# Patient Record
Sex: MALE | Race: WHITE | Employment: OTHER | URBAN - METROPOLITAN AREA
[De-identification: names, ages, dates, MRNs, and addresses within clinical notes are randomized per-mention and may not be internally consistent; named-entity substitution may affect disease eponyms.]

---

## 2022-12-10 ENCOUNTER — APPOINTMENT (OUTPATIENT)
Dept: CT IMAGING | Age: 77
End: 2022-12-10
Payer: MEDICARE

## 2022-12-10 ENCOUNTER — HOSPITAL ENCOUNTER (EMERGENCY)
Age: 77
Discharge: ANOTHER HOSPITAL | End: 2022-12-10
Attending: EMERGENCY MEDICINE
Payer: MEDICARE

## 2022-12-10 ENCOUNTER — APPOINTMENT (OUTPATIENT)
Dept: GENERAL RADIOLOGY | Age: 77
End: 2022-12-10
Payer: MEDICARE

## 2022-12-10 VITALS
RESPIRATION RATE: 15 BRPM | SYSTOLIC BLOOD PRESSURE: 165 MMHG | DIASTOLIC BLOOD PRESSURE: 92 MMHG | TEMPERATURE: 98.2 F | OXYGEN SATURATION: 99 % | HEART RATE: 64 BPM

## 2022-12-10 DIAGNOSIS — I63.9 ACUTE CVA (CEREBROVASCULAR ACCIDENT) (HCC): Primary | ICD-10-CM

## 2022-12-10 LAB
ALBUMIN SERPL-MCNC: 3.8 GM/DL (ref 3.4–5)
ALP BLD-CCNC: 57 IU/L (ref 40–129)
ALT SERPL-CCNC: 10 U/L (ref 10–40)
ANION GAP SERPL CALCULATED.3IONS-SCNC: 8 MMOL/L (ref 4–16)
AST SERPL-CCNC: 13 IU/L (ref 15–37)
BASOPHILS ABSOLUTE: 0.1 K/CU MM
BASOPHILS RELATIVE PERCENT: 1.1 % (ref 0–1)
BILIRUB SERPL-MCNC: 0.3 MG/DL (ref 0–1)
BUN BLDV-MCNC: 25 MG/DL (ref 6–23)
CALCIUM SERPL-MCNC: 8.6 MG/DL (ref 8.3–10.6)
CHLORIDE BLD-SCNC: 102 MMOL/L (ref 99–110)
CO2: 26 MMOL/L (ref 21–32)
CREAT SERPL-MCNC: 1.2 MG/DL (ref 0.9–1.3)
DIFFERENTIAL TYPE: ABNORMAL
EKG ATRIAL RATE: 57 BPM
EKG DIAGNOSIS: NORMAL
EKG P AXIS: 20 DEGREES
EKG P-R INTERVAL: 182 MS
EKG Q-T INTERVAL: 448 MS
EKG QRS DURATION: 110 MS
EKG QTC CALCULATION (BAZETT): 436 MS
EKG R AXIS: -22 DEGREES
EKG T AXIS: 8 DEGREES
EKG VENTRICULAR RATE: 57 BPM
EOSINOPHILS ABSOLUTE: 0.2 K/CU MM
EOSINOPHILS RELATIVE PERCENT: 3.5 % (ref 0–3)
GFR SERPL CREATININE-BSD FRML MDRD: >60 ML/MIN/1.73M2
GLUCOSE BLD-MCNC: 169 MG/DL
GLUCOSE BLD-MCNC: 169 MG/DL (ref 70–99)
GLUCOSE BLD-MCNC: 183 MG/DL (ref 70–99)
HCT VFR BLD CALC: 42.8 % (ref 42–52)
HEMOGLOBIN: 14.1 GM/DL (ref 13.5–18)
IMMATURE NEUTROPHIL %: 0.5 % (ref 0–0.43)
INR BLD: 0.96 INDEX
LYMPHOCYTES ABSOLUTE: 1.4 K/CU MM
LYMPHOCYTES RELATIVE PERCENT: 21.7 % (ref 24–44)
MCH RBC QN AUTO: 28.2 PG (ref 27–31)
MCHC RBC AUTO-ENTMCNC: 32.9 % (ref 32–36)
MCV RBC AUTO: 85.6 FL (ref 78–100)
MONOCYTES ABSOLUTE: 0.8 K/CU MM
MONOCYTES RELATIVE PERCENT: 11.9 % (ref 0–4)
NUCLEATED RBC %: 0 %
PDW BLD-RTO: 13.5 % (ref 11.7–14.9)
PLATELET # BLD: 167 K/CU MM (ref 140–440)
PMV BLD AUTO: 12.5 FL (ref 7.5–11.1)
POTASSIUM SERPL-SCNC: 4.4 MMOL/L (ref 3.5–5.1)
PROTHROMBIN TIME: 12.4 SECONDS (ref 11.7–14.5)
RBC # BLD: 5 M/CU MM (ref 4.6–6.2)
SEGMENTED NEUTROPHILS ABSOLUTE COUNT: 3.9 K/CU MM
SEGMENTED NEUTROPHILS RELATIVE PERCENT: 61.3 % (ref 36–66)
SODIUM BLD-SCNC: 136 MMOL/L (ref 135–145)
TOTAL IMMATURE NEUTOROPHIL: 0.03 K/CU MM
TOTAL NUCLEATED RBC: 0 K/CU MM
TOTAL PROTEIN: 6.9 GM/DL (ref 6.4–8.2)
TROPONIN T: <0.01 NG/ML
WBC # BLD: 6.3 K/CU MM (ref 4–10.5)

## 2022-12-10 PROCEDURE — 85025 COMPLETE CBC W/AUTO DIFF WBC: CPT

## 2022-12-10 PROCEDURE — 82962 GLUCOSE BLOOD TEST: CPT

## 2022-12-10 PROCEDURE — 80053 COMPREHEN METABOLIC PANEL: CPT

## 2022-12-10 PROCEDURE — 70450 CT HEAD/BRAIN W/O DYE: CPT

## 2022-12-10 PROCEDURE — 99285 EMERGENCY DEPT VISIT HI MDM: CPT

## 2022-12-10 PROCEDURE — 85610 PROTHROMBIN TIME: CPT

## 2022-12-10 PROCEDURE — 70498 CT ANGIOGRAPHY NECK: CPT

## 2022-12-10 PROCEDURE — 84484 ASSAY OF TROPONIN QUANT: CPT

## 2022-12-10 PROCEDURE — 71045 X-RAY EXAM CHEST 1 VIEW: CPT

## 2022-12-10 PROCEDURE — 93005 ELECTROCARDIOGRAM TRACING: CPT | Performed by: EMERGENCY MEDICINE

## 2022-12-10 PROCEDURE — 6360000004 HC RX CONTRAST MEDICATION: Performed by: EMERGENCY MEDICINE

## 2022-12-10 RX ADMIN — IOPAMIDOL 80 ML: 755 INJECTION, SOLUTION INTRAVENOUS at 09:57

## 2022-12-10 NOTE — ED NOTES
Transfer discussed with pt and daughter. All questions answered.  Pt a/o at this time and being transferred by MICU to [facility]  12/10/22 2795

## 2022-12-10 NOTE — ED NOTES
Pt arrived via ems with c/o slurred speech and left sided weakness. Per pts family pts LKW was at midnight.  [address] [place] [address]  12/10/22 8721

## 2022-12-10 NOTE — ED PROVIDER NOTES
Vitals   Enc Vitals Group      BP 12/10/22 0955 (!) 180/106      Heart Rate 12/10/22 0955 61      Resp 12/10/22 0955 16      Temp 12/10/22 1001 98.2 °F (36.8 °C)      Temp Source 12/10/22 1001 Oral      SpO2 12/10/22 0955 99 %      Weight --       Height --       Head Circumference --       Peak Flow --       Pain Score --       Pain Loc --       Pain Edu? --       Excl. in [address]? --      GENERAL APPEARANCE: Lying quietly with eyes closed. HEAD: Normocephalic. Atraumatic. EYES: EOM's grossly intact. Sclera anicteric. ENT: Tolerates saliva. No trismus. NECK: Supple. Trachea midline. CARDIO: RRR. Radial pulse 2+. LUNGS: Respirations unlabored. CTAB. ABDOMEN: Soft. Non-distended. Non-tender. EXTREMITIES: No acute deformities. SKIN: Warm and dry. NEUROLOGICAL: James Crease NIH Stroke Scale at 10:50 AM is:  Level of Consciousness:  1 - not alert but arousable by minor stimulation to obey, answer or respond    LOC Questions:  0 - answers both questions correctly    LOC Commands:  0 - performs both tasks correctly    Best Gaze:  2 - forced deviation, or total gaze paresis not overcome by oculocephalic maneuver    Visual Fields:  0 - no visual loss    Facial Palsy:  2 - partial paralysis (total or near total paralysis of the lower face)    Motor-Arm-Left:  4 - no movement    Motor-Leg-Left:  3 - no effort against gravity; leg falls to bed immediately    Motor-Arm-Right:  1 - drift, limb holds 90 (or 45) degrees but drifts down before full 10 seconds: does not hit bed    Motor-Leg-Right:  1 - drift; leg falls by the end of the 5 second period but does not hit bed    Limb Ataxia:  0 - absent    Sensory:  2 - severe to total sensory loss; patient is not aware of being touched in face, arm, leg    Best Language:  2 - severe aphasia; all communication is through fragmentary expression; great need for inference, questioning, and guessing by the listener.   Range of information that can be exchanged is limited; listener carries burden of communication. Examiner cannot identify materials provided from patient response. Dysarthria:  1 - mild to moderate, patient slurs at least some words and at worst, can be understood with some difficulty    Extinction and Inattention:  2 - profound merry-inattention or merry- inattention to more than one modality. Does not recognize own hand or orients only to one side of space     PSYCHIATRIC: Normal mood.      Labs:  Results for orders placed or performed during the hospital encounter of 12/10/22   CBC with Auto Differential   Result Value Ref Range    WBC 6.3 4.0 - 10.5 K/CU MM    RBC 5.00 4.6 - 6.2 M/CU MM    Hemoglobin 14.1 13.5 - 18.0 GM/DL    Hematocrit 42.8 42 - 52 %    MCV 85.6 78 - 100 FL    MCH 28.2 27 - 31 PG    MCHC 32.9 32.0 - 36.0 %    RDW 13.5 11.7 - 14.9 %    Platelets 501 960 - 346 K/CU MM    MPV 12.5 (H) 7.5 - 11.1 FL    Differential Type AUTOMATED DIFFERENTIAL     Segs Relative 61.3 36 - 66 %    Lymphocytes % 21.7 (L) 24 - 44 %    Monocytes % 11.9 (H) 0 - 4 %    Eosinophils % 3.5 (H) 0 - 3 %    Basophils % 1.1 (H) 0 - 1 %    Segs Absolute 3.9 K/CU MM    Lymphocytes Absolute 1.4 K/CU MM    Monocytes Absolute 0.8 K/CU MM    Eosinophils Absolute 0.2 K/CU MM    Basophils Absolute 0.1 K/CU MM    Nucleated RBC % 0.0 %    Total Nucleated RBC 0.0 K/CU MM    Total Immature Neutrophil 0.03 K/CU MM    Immature Neutrophil % 0.5 (H) 0 - 0.43 %   Protime-INR   Result Value Ref Range    Protime 12.4 11.7 - 14.5 SECONDS    INR 0.96 INDEX   POCT Glucose   Result Value Ref Range    Glucose 169 mg/dL   POCT Glucose   Result Value Ref Range    POC Glucose 169 (H) 70 - 99 MG/DL   EKG 12 Lead   Result Value Ref Range    Ventricular Rate 57 BPM    Atrial Rate 57 BPM    P-R Interval 182 ms    QRS Duration 110 ms    Q-T Interval 448 ms    QTc Calculation (Bazett) 436 ms    P Axis 20 degrees    R Axis -22 degrees    T Axis 8 degrees    Diagnosis       Sinus bradycardia  Moderate voltage criteria for LVH, may be normal variant  Borderline ECG  No previous ECGs available         EKG (if obtained): (All EKG's are interpreted by myself in the absence of a cardiologist)  Sinus bradycardia 57. Left axis with poor R wave progression. No ST elevation or depression. No prior EKG available for comparison. Radiographs (if obtained):  [] The following radiograph was interpreted by myself in the absence of a radiologist:  [x] Radiologist's Report reviewed at time of ED visit:  CT HEAD WO CONTRAST    Result Date: 12/10/2022  EXAMINATION: CT OF THE HEAD WITHOUT CONTRAST  12/10/2022 9:43 am TECHNIQUE: CT of the head was performed without the administration of intravenous contrast. Automated exposure control, iterative reconstruction, and/or weight based adjustment of the mA/kV was utilized to reduce the radiation dose to as low as reasonably achievable. COMPARISON: None. HISTORY: ORDERING SYSTEM PROVIDED HISTORY: Stroke Symptoms TECHNOLOGIST PROVIDED HISTORY: Has a \"code stroke\" or \"stroke alert\" been called? ->Yes Reason for exam:->Stroke Symptoms Decision Support Exception - unselect if not a suspected or confirmed emergency medical condition->Emergency Medical Condition (MA) Reason for Exam: left sided weakness and slurred speech FINDINGS: BRAIN/VENTRICLES: There is no acute intracranial hemorrhage, mass effect or midline shift. No abnormal extra-axial fluid collection. The gray-white differentiation is maintained without evidence of an acute infarct. There is no evidence of hydrocephalus. There is mild generalized prominence of the ventricular and cisternal spaces. Mild decreased attenuation in the periventricular and subcortical white matter most consistent with small vessel ischemic change. Scattered intracranial atherosclerotic vascular calcification. ORBITS: The visualized portion of the orbits demonstrate no acute abnormality.  SINUSES: The visualized paranasal sinuses and mastoid air cells demonstrate no acute abnormality. SOFT TISSUES/SKULL:  No acute abnormality of the visualized skull or soft tissues. No acute intracranial abnormality. Findings were discussed with ROCIO NICOLE at 9:54 a.m. on 12/10/2022 by REBECA Treadwell XR CHEST PORTABLE    Result Date: 12/10/2022  EXAMINATION: ONE XRAY VIEW OF THE CHEST 12/10/2022 10:09 am COMPARISON: None. HISTORY: ORDERING SYSTEM PROVIDED HISTORY: stroke symptoms TECHNOLOGIST PROVIDED HISTORY: Reason for exam:->stroke symptoms Reason for Exam: stroke symptoms FINDINGS: The cardiac silhouette is mildly enlarged. Aortic vascular calcification with mild tortuosity. Lung volumes are mildly decreased. No infiltrate, pleural fluid or evidence of overt failure. No acute cardiopulmonary disease. Cardiomegaly without overt failure. CTA HEAD NECK W CONTRAST    Result Date: 12/10/2022  EXAMINATION: CTA OF THE HEAD AND NECK WITH CONTRAST 12/10/2022 9:51 am: TECHNIQUE: CTA of the head and neck was performed with the administration of intravenous contrast. Multiplanar reformatted images are provided for review. MIP images are provided for review. Stenosis of the internal carotid arteries measured using NASCET criteria. Automated exposure control, iterative reconstruction, and/or weight based adjustment of the mA/kV was utilized to reduce the radiation dose to as low as reasonably achievable. This scan was analyzed using [employer]. ai contact LVO. Identification of suspected findings is not for diagnostic use beyond notification. Viz LVO is limited to analysis of imaging data and should not be used in-lieu of full patient evaluation or relied upon to make or confirm diagnosis. COMPARISON: None. HISTORY: ORDERING SYSTEM PROVIDED HISTORY: Stroke Symptoms TECHNOLOGIST PROVIDED HISTORY: Has a \"code stroke\" or \"stroke alert\" been called? ->Yes Reason for exam:->Stroke Symptoms Decision Support Exception - unselect if not a suspected or confirmed emergency medical condition->Emergency Medical Condition (MA) Reason for Exam: left sided weakness and slurred speech Additional signs and symptoms: 80ml Isovue 370 FINDINGS: CTA NECK: AORTIC ARCH/ARCH VESSELS: No dissection or arterial injury. No significant stenosis of the brachiocephalic or subclavian arteries. CAROTID ARTERIES: Critical near-complete focal occlusion of the proximal right cervical internal carotid artery. The bilateral common left internal carotid arteries appear patent without significant stenosis. VERTEBRAL ARTERIES: Severe stenosis of the right vertebral artery origin. Severe stenosis of the left vertebral artery origin. SOFT TISSUES: The lung apices are clear. No cervical or superior mediastinal lymphadenopathy. The larynx and pharynx are unremarkable. No acute abnormality of the salivary and thyroid glands. Incidental thyroid nodules, possibly measuring up to 1.5 cm on the left. BONES: No acute osseous abnormality. Multilevel cervical spondylosis. Left upper 2nd molar periapical lucency. CTA HEAD: ANTERIOR CIRCULATION: Significant asymmetrically decreased contrast enhancement of the right internal carotid artery, anterior cerebral arteries, and middle cerebral artery. The left internal carotid artery, anterior cerebral artery, middle cerebral artery appear patent without significant stenosis. No aneurysm. POSTERIOR CIRCULATION: No significant stenosis of the vertebral, basilar, or posterior cerebral arteries. No aneurysm. OTHER: No dural venous sinus thrombosis on this non-dedicated study. BRAIN: Loss of gray-white matter differentiation in the right MCA territory and possibly right SUNNY territory. No mass effect or midline shift. No extra-axial fluid collection. 1. Critical near-complete focal occlusion of the proximal right cervical ICA. There is significantly decreased contrast enhancement in the subsequent right anterior circulation of the head and neck.  2. Loss of gray-white matter differentiation in the right MCA territory and possibly right SUNNY territory compatible large territorial ischemia. 3. Severe stenosis of the right vertebral artery origin. 4. Severe stenosis of the left vertebral artery origin. 5. Incidental thyroid nodules, possibly measuring up to 1.5 cm on the left. Can correlate with non-emergent thyroid ultrasound evaluation. 6. Incidental left upper 2nd molar periapical lucency. Can correlate with dental examination. Preliminary findings discussed with Dr. Charlotte Pepe on 12/10/2022 at 10:07 a.m. ED Course and MDM:  Stroke alert initiated prehospital per EMS report. He is taken directly for CT head which is nonacute per radiologist report to myself. CTA head and neck obtained while patient in radiology. On return to the emergency department, I was able to assess the patient. NIHSS is 21 on my assessment. He is mildly hypertensive. No acute intervention undertaken in setting of acute stroke. I spoke to Dr. Jaxon Cedeno, on-call for [facility] neurology. She recommends transfer to [facility] given severity of deficits. Patient is out of the window for [facility] but may be a candidate for other intervention. Patient will be transferred by critical care. They are unable to fly currently due to weather. Final results of the CTA have been communicated to [facility] with results noted above. I have also spoken with patient's daughter and the patient himself to make them aware of the findings, concerns, and plan of care. Patient transferred in serious condition. I spent at least 34 minutes of critical care time with this patient. This does not include time spent on separately reported billable procedures. Critical care time provided for acute CVA that required close evaluation and/or intervention with concern for patient decompensation. Final Impression:  1.  Acute CVA (cerebrovascular accident) [facility])      DISPOSITION Decision To Transfer 12/10/2022 10:12:58 AM      Patient referred to:  No follow-up provider specified.   Discharge medications:  New Prescriptions    No medications on file     (Please note that portions of this note may have been completed with a voice recognition program. Efforts were made to edit the dictations but occasionally words are mis-transcribed.)    DO Jolie Dixno DO  12/10/22 1910

## 2022-12-10 NOTE — ED NOTES
Transfer to [facility]  Reason: Acute CVA  ED provider: Dr Quan Varma  Stroke Attending: Dr Darcy [facility]  [address]  12/10/22 6467

## 2022-12-10 NOTE — ED NOTES
Spoke with Nam at [address] [place] [address] at 21 [phone] and confirmed the fax was received by the [address] [place] [address] transfer center     [facility]  12/10/22 0771

## 2022-12-13 LAB
EKG ATRIAL RATE: 57 BPM
EKG DIAGNOSIS: NORMAL
EKG P AXIS: 20 DEGREES
EKG P-R INTERVAL: 182 MS
EKG Q-T INTERVAL: 448 MS
EKG QRS DURATION: 110 MS
EKG QTC CALCULATION (BAZETT): 436 MS
EKG R AXIS: -22 DEGREES
EKG T AXIS: 8 DEGREES
EKG VENTRICULAR RATE: 57 BPM

## 2022-12-13 PROCEDURE — 93010 ELECTROCARDIOGRAM REPORT: CPT | Performed by: INTERNAL MEDICINE

## 2023-05-23 ENCOUNTER — HOSPITAL ENCOUNTER (OUTPATIENT)
Age: 78
Setting detail: SPECIMEN
Discharge: HOME | End: 2023-05-23
Payer: MEDICARE

## 2023-05-23 LAB
INR BLD: 1.09 INDEX
PROTHROMBIN TIME: 13.9 SECONDS (ref 11.7–14.5)

## 2023-05-23 PROCEDURE — 85610 PROTHROMBIN TIME: CPT

## 2023-06-05 ENCOUNTER — HOSPITAL ENCOUNTER (OUTPATIENT)
Age: 78
Setting detail: SPECIMEN
Discharge: HOME | End: 2023-06-05
Payer: MEDICARE

## 2023-06-05 LAB
ALBUMIN SERPL-MCNC: 3.8 GM/DL (ref 3.4–5)
ALP BLD-CCNC: 81 IU/L (ref 40–129)
ALT SERPL-CCNC: 17 U/L (ref 10–40)
ANION GAP SERPL CALCULATED.3IONS-SCNC: 13 MMOL/L (ref 4–16)
AST SERPL-CCNC: 19 IU/L (ref 15–37)
BILIRUB SERPL-MCNC: 0.4 MG/DL (ref 0–1)
BUN SERPL-MCNC: 23 MG/DL (ref 6–23)
CALCIUM SERPL-MCNC: 9.3 MG/DL (ref 8.3–10.6)
CHLORIDE BLD-SCNC: 97 MMOL/L (ref 99–110)
CO2: 31 MMOL/L (ref 21–32)
CREAT SERPL-MCNC: 0.9 MG/DL (ref 0.9–1.3)
GFR SERPL CREATININE-BSD FRML MDRD: >60 ML/MIN/1.73M2
GLUCOSE SERPL-MCNC: 132 MG/DL (ref 70–99)
HCT VFR BLD CALC: 39.3 % (ref 42–52)
HEMOGLOBIN: 11.6 GM/DL (ref 13.5–18)
MCH RBC QN AUTO: 26.4 PG (ref 27–31)
MCHC RBC AUTO-ENTMCNC: 29.5 % (ref 32–36)
MCV RBC AUTO: 89.3 FL (ref 78–100)
PDW BLD-RTO: 16.4 % (ref 11.7–14.9)
PLATELET # BLD: 197 K/CU MM (ref 140–440)
PMV BLD AUTO: 12.7 FL (ref 7.5–11.1)
POTASSIUM SERPL-SCNC: 3.8 MMOL/L (ref 3.5–5.1)
RBC # BLD: 4.4 M/CU MM (ref 4.6–6.2)
SODIUM BLD-SCNC: 141 MMOL/L (ref 135–145)
TOTAL PROTEIN: 7 GM/DL (ref 6.4–8.2)
TSH SERPL DL<=0.005 MIU/L-ACNC: 1.94 UIU/ML (ref 0.27–4.2)
VITAMIN B-12: 1006 PG/ML (ref 211–911)
WBC # BLD: 4.8 K/CU MM (ref 4–10.5)

## 2023-06-05 PROCEDURE — 80053 COMPREHEN METABOLIC PANEL: CPT

## 2023-06-05 PROCEDURE — 82607 VITAMIN B-12: CPT

## 2023-06-05 PROCEDURE — 85027 COMPLETE CBC AUTOMATED: CPT

## 2023-06-05 PROCEDURE — 36415 COLL VENOUS BLD VENIPUNCTURE: CPT

## 2023-06-05 PROCEDURE — 84443 ASSAY THYROID STIM HORMONE: CPT

## 2023-06-23 ENCOUNTER — HOSPITAL ENCOUNTER (OUTPATIENT)
Age: 78
Setting detail: SPECIMEN
Discharge: HOME | End: 2023-06-23
Payer: MEDICARE

## 2023-06-23 LAB
INR BLD: 1.4 INDEX
PROTHROMBIN TIME: 17.1 SECONDS (ref 11.7–14.5)

## 2023-06-23 PROCEDURE — 85610 PROTHROMBIN TIME: CPT

## 2023-06-23 PROCEDURE — 36415 COLL VENOUS BLD VENIPUNCTURE: CPT

## 2023-07-02 ENCOUNTER — HOSPITAL ENCOUNTER (OUTPATIENT)
Age: 78
Setting detail: SPECIMEN
Discharge: HOME | End: 2023-07-02
Payer: MEDICARE

## 2023-07-02 LAB
INR BLD: 1.2 INDEX
PROTHROMBIN TIME: 15.3 SECONDS (ref 11.7–14.5)

## 2023-07-02 PROCEDURE — 85610 PROTHROMBIN TIME: CPT

## 2023-07-02 PROCEDURE — 36415 COLL VENOUS BLD VENIPUNCTURE: CPT

## 2023-07-13 ENCOUNTER — HOSPITAL ENCOUNTER (OUTPATIENT)
Age: 78
Setting detail: SPECIMEN
Discharge: HOME | End: 2023-07-13
Payer: MEDICARE

## 2023-07-13 PROCEDURE — 87075 CULTR BACTERIA EXCEPT BLOOD: CPT

## 2023-07-13 PROCEDURE — 87070 CULTURE OTHR SPECIMN AEROBIC: CPT

## 2023-07-15 LAB
CULTURE: NORMAL
Lab: NORMAL
SPECIMEN: NORMAL

## 2023-07-21 LAB
CULTURE: ABNORMAL
CULTURE: ABNORMAL
Lab: ABNORMAL
SPECIMEN: ABNORMAL

## 2023-08-11 ENCOUNTER — HOSPITAL ENCOUNTER (EMERGENCY)
Age: 78
Discharge: ANOTHER HOSPITAL | End: 2023-08-11
Attending: STUDENT IN AN ORGANIZED HEALTH CARE EDUCATION/TRAINING PROGRAM
Payer: MEDICARE

## 2023-08-11 ENCOUNTER — APPOINTMENT (OUTPATIENT)
Dept: GENERAL RADIOLOGY | Age: 78
End: 2023-08-11
Payer: MEDICARE

## 2023-08-11 ENCOUNTER — APPOINTMENT (OUTPATIENT)
Dept: CT IMAGING | Age: 78
End: 2023-08-11
Payer: MEDICARE

## 2023-08-11 VITALS
SYSTOLIC BLOOD PRESSURE: 93 MMHG | HEIGHT: 69 IN | WEIGHT: 220 LBS | OXYGEN SATURATION: 94 % | RESPIRATION RATE: 18 BRPM | TEMPERATURE: 98.2 F | DIASTOLIC BLOOD PRESSURE: 61 MMHG | HEART RATE: 82 BPM | BODY MASS INDEX: 32.58 KG/M2

## 2023-08-11 DIAGNOSIS — I95.89 HYPOTENSION DUE TO HYPOVOLEMIA: ICD-10-CM

## 2023-08-11 DIAGNOSIS — S06.5XAA SDH (SUBDURAL HEMATOMA) (HCC): Primary | ICD-10-CM

## 2023-08-11 DIAGNOSIS — N17.9 AKI (ACUTE KIDNEY INJURY) (HCC): ICD-10-CM

## 2023-08-11 DIAGNOSIS — E86.0 DEHYDRATION: ICD-10-CM

## 2023-08-11 DIAGNOSIS — E86.1 HYPOTENSION DUE TO HYPOVOLEMIA: ICD-10-CM

## 2023-08-11 LAB
ALBUMIN SERPL-MCNC: 3.1 GM/DL (ref 3.4–5)
ALP BLD-CCNC: 54 IU/L (ref 40–128)
ALT SERPL-CCNC: 6 U/L (ref 10–40)
ANION GAP SERPL CALCULATED.3IONS-SCNC: 16 MMOL/L (ref 4–16)
AST SERPL-CCNC: 10 IU/L (ref 15–37)
BACTERIA: NEGATIVE /HPF
BASOPHILS ABSOLUTE: 0 K/CU MM
BASOPHILS RELATIVE PERCENT: 0.3 % (ref 0–1)
BILIRUB SERPL-MCNC: 0.8 MG/DL (ref 0–1)
BILIRUBIN URINE: NEGATIVE MG/DL
BLOOD, URINE: ABNORMAL
BUN SERPL-MCNC: 51 MG/DL (ref 6–23)
CALCIUM SERPL-MCNC: 8.5 MG/DL (ref 8.3–10.6)
CHLORIDE BLD-SCNC: 98 MMOL/L (ref 99–110)
CHP ED QC CHECK: NORMAL
CLARITY: CLEAR
CO2: 27 MMOL/L (ref 21–32)
COLOR: YELLOW
CREAT SERPL-MCNC: 4.4 MG/DL (ref 0.9–1.3)
DIFFERENTIAL TYPE: ABNORMAL
EOSINOPHILS ABSOLUTE: 0.5 K/CU MM
EOSINOPHILS RELATIVE PERCENT: 5.1 % (ref 0–3)
GFR SERPL CREATININE-BSD FRML MDRD: 13 ML/MIN/1.73M2
GLUCOSE BLD-MCNC: 122 MG/DL
GLUCOSE BLD-MCNC: 122 MG/DL (ref 70–99)
GLUCOSE SERPL-MCNC: 144 MG/DL (ref 70–99)
GLUCOSE, URINE: NEGATIVE MG/DL
HCT VFR BLD CALC: 26 % (ref 42–52)
HEMOGLOBIN: 8 GM/DL (ref 13.5–18)
HYALINE CASTS: 2 /LPF
IMMATURE NEUTROPHIL %: 0.3 % (ref 0–0.43)
INR BLD: 1.5 INDEX
KETONES, URINE: NEGATIVE MG/DL
LACTATE: 3.8 MMOL/L (ref 0.5–1.9)
LEUKOCYTE ESTERASE, URINE: NEGATIVE
LIPASE: 17 IU/L (ref 13–60)
LYMPHOCYTES ABSOLUTE: 1.3 K/CU MM
LYMPHOCYTES RELATIVE PERCENT: 14 % (ref 24–44)
MCH RBC QN AUTO: 27.1 PG (ref 27–31)
MCHC RBC AUTO-ENTMCNC: 30.8 % (ref 32–36)
MCV RBC AUTO: 88.1 FL (ref 78–100)
MONOCYTES ABSOLUTE: 0.7 K/CU MM
MONOCYTES RELATIVE PERCENT: 7.5 % (ref 0–4)
MUCUS: ABNORMAL HPF
NITRITE URINE, QUANTITATIVE: NEGATIVE
NUCLEATED RBC %: 0 %
PDW BLD-RTO: 18.6 % (ref 11.7–14.9)
PH, URINE: 5 (ref 5–8)
PLATELET # BLD: 161 K/CU MM (ref 140–440)
PMV BLD AUTO: 12.8 FL (ref 7.5–11.1)
POTASSIUM SERPL-SCNC: 4.5 MMOL/L (ref 3.5–5.1)
PROTEIN UA: ABNORMAL MG/DL
PROTHROMBIN TIME: 18.8 SECONDS (ref 11.7–14.5)
RBC # BLD: 2.95 M/CU MM (ref 4.6–6.2)
RBC URINE: <1 /HPF (ref 0–3)
SEGMENTED NEUTROPHILS ABSOLUTE COUNT: 6.6 K/CU MM
SEGMENTED NEUTROPHILS RELATIVE PERCENT: 72.8 % (ref 36–66)
SODIUM BLD-SCNC: 141 MMOL/L (ref 135–145)
SPECIFIC GRAVITY UA: 1.01 (ref 1–1.03)
T4 FREE SERPL-MCNC: 1.35 NG/DL (ref 0.9–1.8)
TOTAL IMMATURE NEUTOROPHIL: 0.03 K/CU MM
TOTAL NUCLEATED RBC: 0 K/CU MM
TOTAL PROTEIN: 6 GM/DL (ref 6.4–8.2)
TRICHOMONAS: ABNORMAL /HPF
TSH SERPL DL<=0.005 MIU/L-ACNC: 1.43 UIU/ML (ref 0.27–4.2)
UROBILINOGEN, URINE: 0.2 MG/DL (ref 0.2–1)
WBC # BLD: 9 K/CU MM (ref 4–10.5)
WBC CLUMP: ABNORMAL /HPF
WBC UA: 4 /HPF (ref 0–2)

## 2023-08-11 PROCEDURE — 6360000002 HC RX W HCPCS: Performed by: STUDENT IN AN ORGANIZED HEALTH CARE EDUCATION/TRAINING PROGRAM

## 2023-08-11 PROCEDURE — 83605 ASSAY OF LACTIC ACID: CPT

## 2023-08-11 PROCEDURE — 72125 CT NECK SPINE W/O DYE: CPT

## 2023-08-11 PROCEDURE — 82962 GLUCOSE BLOOD TEST: CPT

## 2023-08-11 PROCEDURE — 85025 COMPLETE CBC W/AUTO DIFF WBC: CPT

## 2023-08-11 PROCEDURE — 96365 THER/PROPH/DIAG IV INF INIT: CPT

## 2023-08-11 PROCEDURE — 93005 ELECTROCARDIOGRAM TRACING: CPT

## 2023-08-11 PROCEDURE — 84439 ASSAY OF FREE THYROXINE: CPT

## 2023-08-11 PROCEDURE — 85610 PROTHROMBIN TIME: CPT

## 2023-08-11 PROCEDURE — 80053 COMPREHEN METABOLIC PANEL: CPT

## 2023-08-11 PROCEDURE — 2580000003 HC RX 258: Performed by: STUDENT IN AN ORGANIZED HEALTH CARE EDUCATION/TRAINING PROGRAM

## 2023-08-11 PROCEDURE — 87040 BLOOD CULTURE FOR BACTERIA: CPT

## 2023-08-11 PROCEDURE — 84443 ASSAY THYROID STIM HORMONE: CPT

## 2023-08-11 PROCEDURE — 81001 URINALYSIS AUTO W/SCOPE: CPT

## 2023-08-11 PROCEDURE — 70450 CT HEAD/BRAIN W/O DYE: CPT

## 2023-08-11 PROCEDURE — 99285 EMERGENCY DEPT VISIT HI MDM: CPT

## 2023-08-11 PROCEDURE — 71045 X-RAY EXAM CHEST 1 VIEW: CPT

## 2023-08-11 PROCEDURE — 96361 HYDRATE IV INFUSION ADD-ON: CPT

## 2023-08-11 PROCEDURE — 83690 ASSAY OF LIPASE: CPT

## 2023-08-11 RX ORDER — 0.9 % SODIUM CHLORIDE 0.9 %
30 INTRAVENOUS SOLUTION INTRAVENOUS ONCE
Status: COMPLETED | OUTPATIENT
Start: 2023-08-11 | End: 2023-08-11

## 2023-08-11 RX ADMIN — SODIUM CHLORIDE 2121 ML: 9 INJECTION, SOLUTION INTRAVENOUS at 19:14

## 2023-08-11 RX ADMIN — LEVETIRACETAM 1000 MG: 500 INJECTION INTRAVENOUS at 21:00

## 2023-08-11 ASSESSMENT — PAIN - FUNCTIONAL ASSESSMENT: PAIN_FUNCTIONAL_ASSESSMENT: NONE - DENIES PAIN

## 2023-08-11 NOTE — ED PROVIDER NOTES
[facility]  Emergency Department Encounter    Pt Markos Pradhan  MRN: [id number]  [facility] [facility] 1945  Date of evaluation: 8/11/23  PCP:  No primary care provider on file. CHIEF COMPLAINT       Chief Complaint   Patient presents with    Altered Mental Status       HISTORY OF PRESENT ILLNESS     Harry Fregoso is a 66 y.o. male who presents with altered mental status. Patient presents via EMS from [facility]. 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 Patient taken from EMS reportedly has been more confused. Unclear baseline. Unclear last known well. Further history was obtained from daughter present at bedside. States usually he can speak in full sentences. Last known well time sometime this morning. Follows with neurosurgery, Dr. Wiley Andino at [facility]. Was recently at [facility] emergency department 2 days ago with a supratherapeutic INR around 12. Has not been taking his Coumadin for the last several days. PAST MEDICAL / SURGICAL / SOCIAL / FAMILY HISTORY      has no past medical history on file. has no past surgical history on file.     Social History     Socioeconomic History    Marital status:      Spouse name: Not on file    Number of children: Not on file    Years of education: Not on file    Highest education level: Not on file   Occupational History    Not on file   Tobacco Use    Smoking status: Not on file    Smokeless tobacco: Not on file   Substance and Sexual Activity    Alcohol use: Not on file    Drug use: Not on file    Sexual activity: Not on file   Other Topics Concern    Not on file   Social History Narrative    Not on file     Social Determinants of Health     Financial Resource Strain: Not on file   Food Insecurity: Not appear clinically dehydrated. Plan to initiate 30 cc/kg normal saline bolus. Differential includes stroke, sepsis, UTI, pneumonia, infection, aspiration, electrolyte abnormality, hypoglycemia, possible drug overdose due to narrow pupils, thyroid or metabolic abnormality among others. CBC shows Chronic anemia of 8.0 of unclear etiology, no cytosis. BMP remarkable for significant JUAN of 4.4 previous was around 1. Initial lactic acid of 3.8. He is appears significantly dehydrated. Patient was given 2 L of fluid, his hypotension improved but pressure still remained soft in the emergency department 93/61. TSH is abnormal however free T4 is normal.    Point-of-care glucose rules out hyponatremia. CT scan of the head shows 2 small acute subdural hematomas. Consulted with Dr. Nba Ronquillo with neurosurgery who recommended Keppra and transferred to higher level of care. I discussed with the family where there was ago and he preferred to go to [facility] however upon contacting [facility] health transfer center I was informed that they have beds. I was able to discuss the case with the neurosurgery team at [facility] in [place] who accept the patient. Patient will be transferred via [facility] to [facility] for neurosurgical evaluation and medical management. I stated that the benefits of transfer to the daughter who is agreeable to transfer. I spent significant mount of time discussing with her the need to transfer to higher level of care and why the patient cannot go to [facility] at this time.     Problems Addressed:  JUAN (acute kidney injury) ([address]): acute illness or injury that poses a threat to life or bodily functions  Dehydration: acute illness or injury that poses a threat to life or bodily functions  Hypotension due to hypovolemia: acute illness or injury that poses a threat to life or bodily functions  SDH (subdural hematoma) ([address]): acute illness or injury    Amount and/or

## 2023-08-12 LAB
EKG ATRIAL RATE: 77 BPM
EKG DIAGNOSIS: NORMAL
EKG P AXIS: 23 DEGREES
EKG P-R INTERVAL: 164 MS
EKG Q-T INTERVAL: 470 MS
EKG QRS DURATION: 92 MS
EKG QTC CALCULATION (BAZETT): 531 MS
EKG R AXIS: -16 DEGREES
EKG T AXIS: 8 DEGREES
EKG VENTRICULAR RATE: 77 BPM

## 2023-08-12 NOTE — ED NOTES
Christi Salgado called states Dr Radha Dominique @ [facility] is the accepting pt awaiting transport by [facility]
Dr Sherman Mills returned call
Logan Potts to transfer pt to [facility]
Paged Dr Patsy Vasquez
Detail Level: Detailed

## 2023-08-12 NOTE — PROGRESS NOTES
Patient to go to [facility] per Dr. Riddle Dates. [facility] onsite and updated. Family, Manju updated.

## 2023-08-13 LAB
CULTURE: NORMAL
CULTURE: NORMAL
Lab: NORMAL
Lab: NORMAL
SPECIMEN: NORMAL
SPECIMEN: NORMAL

## 2023-08-16 ENCOUNTER — HOSPITAL ENCOUNTER (OUTPATIENT)
Age: 78
Setting detail: SPECIMEN
Discharge: HOME | End: 2023-08-16

## 2023-08-16 LAB
CULTURE: NORMAL
CULTURE: NORMAL
Lab: NORMAL
Lab: NORMAL
SPECIMEN: NORMAL
SPECIMEN: NORMAL

## 2023-08-31 ENCOUNTER — HOSPITAL ENCOUNTER (OUTPATIENT)
Age: 78
Setting detail: SPECIMEN
Discharge: HOME | End: 2023-08-31

## 2023-08-31 LAB
INR BLD: 1.1 INDEX
PROTHROMBIN TIME: 14.6 SECONDS (ref 11.7–14.5)

## 2023-08-31 PROCEDURE — 36415 COLL VENOUS BLD VENIPUNCTURE: CPT

## 2023-08-31 PROCEDURE — 9900360100 HC STAT COLLECTION FEE SNF

## 2023-08-31 PROCEDURE — 85610 PROTHROMBIN TIME: CPT

## 2023-09-07 ENCOUNTER — HOSPITAL ENCOUNTER (OUTPATIENT)
Age: 78
Setting detail: SPECIMEN
Discharge: HOME | End: 2023-09-07
Payer: MEDICARE

## 2023-09-07 LAB
INR BLD: 1.1 INDEX
PROTHROMBIN TIME: 14.3 SECONDS (ref 11.7–14.5)

## 2023-09-07 PROCEDURE — 85610 PROTHROMBIN TIME: CPT

## 2023-09-07 PROCEDURE — 36415 COLL VENOUS BLD VENIPUNCTURE: CPT

## 2023-09-14 ENCOUNTER — HOSPITAL ENCOUNTER (OUTPATIENT)
Age: 78
Setting detail: SPECIMEN
Discharge: HOME | End: 2023-09-14
Payer: MEDICARE

## 2023-09-14 LAB
INR BLD: 1.1 INDEX
PROTHROMBIN TIME: 14.4 SECONDS (ref 11.7–14.5)

## 2023-09-14 PROCEDURE — 85610 PROTHROMBIN TIME: CPT

## 2023-09-14 PROCEDURE — 36415 COLL VENOUS BLD VENIPUNCTURE: CPT

## 2023-09-20 ENCOUNTER — HOSPITAL ENCOUNTER (OUTPATIENT)
Age: 78
Setting detail: SPECIMEN
Discharge: HOME | End: 2023-09-20
Payer: MEDICARE

## 2023-09-20 LAB
INR BLD: 1.1 INDEX
PROTHROMBIN TIME: 14.4 SECONDS (ref 11.7–14.5)

## 2023-09-20 PROCEDURE — 36415 COLL VENOUS BLD VENIPUNCTURE: CPT

## 2023-09-20 PROCEDURE — 85610 PROTHROMBIN TIME: CPT

## 2023-09-27 ENCOUNTER — HOSPITAL ENCOUNTER (OUTPATIENT)
Age: 78
Setting detail: SPECIMEN
Discharge: HOME | End: 2023-09-27
Payer: MEDICARE

## 2023-09-27 LAB
INR BLD: 1.1 INDEX
PROTHROMBIN TIME: 14.2 SECONDS (ref 11.7–14.5)

## 2023-09-27 PROCEDURE — 36415 COLL VENOUS BLD VENIPUNCTURE: CPT

## 2023-09-27 PROCEDURE — 85610 PROTHROMBIN TIME: CPT

## 2023-10-04 ENCOUNTER — HOSPITAL ENCOUNTER (OUTPATIENT)
Age: 78
Setting detail: SPECIMEN
Discharge: HOME | End: 2023-10-04
Payer: MEDICARE

## 2023-10-04 LAB
INR BLD: 1.2 INDEX
PROTHROMBIN TIME: 15.1 SECONDS (ref 11.7–14.5)

## 2023-10-04 PROCEDURE — 85610 PROTHROMBIN TIME: CPT

## 2023-10-04 PROCEDURE — 36415 COLL VENOUS BLD VENIPUNCTURE: CPT

## 2023-10-11 ENCOUNTER — HOSPITAL ENCOUNTER (OUTPATIENT)
Age: 78
Setting detail: SPECIMEN
Discharge: HOME | End: 2023-10-11
Payer: MEDICARE

## 2023-10-11 LAB
INR BLD: 1.1 INDEX
PROTHROMBIN TIME: 14.8 SECONDS (ref 11.7–14.5)

## 2023-10-11 PROCEDURE — 85610 PROTHROMBIN TIME: CPT

## 2023-10-11 PROCEDURE — 36415 COLL VENOUS BLD VENIPUNCTURE: CPT

## 2023-10-18 ENCOUNTER — HOSPITAL ENCOUNTER (OUTPATIENT)
Age: 78
Setting detail: SPECIMEN
Discharge: HOME | End: 2023-10-18
Payer: MEDICARE

## 2023-10-18 LAB
INR BLD: 1.1 INDEX
PROTHROMBIN TIME: 14.6 SECONDS (ref 11.7–14.5)

## 2023-10-18 PROCEDURE — 36415 COLL VENOUS BLD VENIPUNCTURE: CPT

## 2023-10-18 PROCEDURE — 85610 PROTHROMBIN TIME: CPT

## 2023-10-25 ENCOUNTER — HOSPITAL ENCOUNTER (OUTPATIENT)
Age: 78
Setting detail: SPECIMEN
Discharge: HOME | End: 2023-10-25
Payer: MEDICARE

## 2023-10-25 LAB
INR BLD: 1.1 INDEX
PROTHROMBIN TIME: 14.8 SECONDS (ref 11.7–14.5)

## 2023-10-25 PROCEDURE — 36415 COLL VENOUS BLD VENIPUNCTURE: CPT

## 2023-10-25 PROCEDURE — 85610 PROTHROMBIN TIME: CPT

## 2023-11-01 ENCOUNTER — HOSPITAL ENCOUNTER (OUTPATIENT)
Age: 78
Setting detail: SPECIMEN
Discharge: HOME | End: 2023-11-01
Payer: MEDICARE

## 2023-11-01 LAB
INR BLD: 1.1 INDEX
PROTHROMBIN TIME: 14.5 SECONDS (ref 11.7–14.5)

## 2023-11-01 PROCEDURE — 85610 PROTHROMBIN TIME: CPT

## 2023-11-01 PROCEDURE — 36415 COLL VENOUS BLD VENIPUNCTURE: CPT

## 2023-11-08 ENCOUNTER — HOSPITAL ENCOUNTER (OUTPATIENT)
Age: 78
Setting detail: SPECIMEN
Discharge: HOME | End: 2023-11-08
Payer: MEDICARE

## 2023-11-08 LAB
INR BLD: 1.2 INDEX
PROTHROMBIN TIME: 15 SECONDS (ref 11.7–14.5)

## 2023-11-08 PROCEDURE — 85610 PROTHROMBIN TIME: CPT

## 2023-11-08 PROCEDURE — 36415 COLL VENOUS BLD VENIPUNCTURE: CPT

## 2023-11-15 ENCOUNTER — HOSPITAL ENCOUNTER (OUTPATIENT)
Age: 78
Setting detail: SPECIMEN
Discharge: HOME | End: 2023-11-15
Payer: MEDICARE

## 2023-11-15 LAB
INR BLD: 1.1 INDEX
PROTHROMBIN TIME: 14.8 SECONDS (ref 11.7–14.5)

## 2023-11-15 PROCEDURE — 36415 COLL VENOUS BLD VENIPUNCTURE: CPT

## 2023-11-15 PROCEDURE — 85610 PROTHROMBIN TIME: CPT

## 2023-11-22 ENCOUNTER — HOSPITAL ENCOUNTER (OUTPATIENT)
Age: 78
Setting detail: SPECIMEN
Discharge: HOME | End: 2023-11-22
Payer: MEDICARE

## 2023-11-22 LAB
INR BLD: 1.2 INDEX
PROTHROMBIN TIME: 15.2 SECONDS (ref 11.7–14.5)

## 2023-11-22 PROCEDURE — 36415 COLL VENOUS BLD VENIPUNCTURE: CPT

## 2023-11-22 PROCEDURE — 85610 PROTHROMBIN TIME: CPT

## 2023-11-29 ENCOUNTER — HOSPITAL ENCOUNTER (OUTPATIENT)
Age: 78
Setting detail: SPECIMEN
Discharge: HOME | End: 2023-11-29
Payer: MEDICARE

## 2023-11-29 LAB
INR BLD: 1.1 INDEX
PROTHROMBIN TIME: 14 SECONDS (ref 11.7–14.5)

## 2023-11-29 PROCEDURE — 85610 PROTHROMBIN TIME: CPT

## 2023-11-29 PROCEDURE — 36415 COLL VENOUS BLD VENIPUNCTURE: CPT

## 2023-12-06 ENCOUNTER — HOSPITAL ENCOUNTER (OUTPATIENT)
Age: 78
Setting detail: SPECIMEN
Discharge: HOME | End: 2023-12-06
Payer: MEDICARE

## 2023-12-06 LAB
INR BLD: 1.1 INDEX
PROTHROMBIN TIME: 14.3 SECONDS (ref 11.7–14.5)

## 2023-12-06 PROCEDURE — 36415 COLL VENOUS BLD VENIPUNCTURE: CPT

## 2023-12-06 PROCEDURE — 85610 PROTHROMBIN TIME: CPT

## 2023-12-13 ENCOUNTER — HOSPITAL ENCOUNTER (OUTPATIENT)
Age: 78
Setting detail: SPECIMEN
Discharge: HOME | End: 2023-12-13
Payer: MEDICARE

## 2023-12-13 LAB
INR BLD: 1.1 INDEX
PROTHROMBIN TIME: 14.3 SECONDS (ref 11.7–14.5)

## 2023-12-13 PROCEDURE — 85610 PROTHROMBIN TIME: CPT

## 2023-12-13 PROCEDURE — 36415 COLL VENOUS BLD VENIPUNCTURE: CPT

## 2023-12-27 ENCOUNTER — HOSPITAL ENCOUNTER (OUTPATIENT)
Age: 78
Setting detail: SPECIMEN
Discharge: HOME | End: 2023-12-27
Payer: MEDICARE

## 2023-12-27 LAB
INR BLD: 1.1 INDEX
PROTHROMBIN TIME: 14.7 SECONDS (ref 11.7–14.5)

## 2023-12-27 PROCEDURE — 85610 PROTHROMBIN TIME: CPT

## 2023-12-27 PROCEDURE — 36415 COLL VENOUS BLD VENIPUNCTURE: CPT

## 2024-01-03 ENCOUNTER — HOSPITAL ENCOUNTER (OUTPATIENT)
Age: 79
Setting detail: SPECIMEN
Discharge: HOME | End: 2024-01-03
Payer: MEDICARE

## 2024-01-03 LAB
INR BLD: 1.2 INDEX
PROTHROMBIN TIME: 15 SECONDS (ref 11.7–14.5)

## 2024-01-03 PROCEDURE — 36415 COLL VENOUS BLD VENIPUNCTURE: CPT

## 2024-01-03 PROCEDURE — 85610 PROTHROMBIN TIME: CPT

## 2024-01-10 ENCOUNTER — HOSPITAL ENCOUNTER (OUTPATIENT)
Age: 79
Setting detail: SPECIMEN
Discharge: HOME | End: 2024-01-10
Payer: MEDICARE

## 2024-01-10 LAB
INR BLD: 1.1 INDEX
PROTHROMBIN TIME: 14.1 SECONDS (ref 11.7–14.5)

## 2024-01-10 PROCEDURE — 85610 PROTHROMBIN TIME: CPT

## 2024-01-10 PROCEDURE — 36415 COLL VENOUS BLD VENIPUNCTURE: CPT

## 2024-01-17 ENCOUNTER — HOSPITAL ENCOUNTER (OUTPATIENT)
Age: 79
Setting detail: SPECIMEN
Discharge: HOME | End: 2024-01-17
Payer: MEDICARE

## 2024-01-17 LAB
INR BLD: 1.1 INDEX
PROTHROMBIN TIME: 14.1 SECONDS (ref 11.7–14.5)

## 2024-01-17 PROCEDURE — 36415 COLL VENOUS BLD VENIPUNCTURE: CPT

## 2024-01-17 PROCEDURE — 85610 PROTHROMBIN TIME: CPT

## 2024-01-24 ENCOUNTER — HOSPITAL ENCOUNTER (OUTPATIENT)
Age: 79
Setting detail: SPECIMEN
Discharge: HOME | End: 2024-01-24
Payer: MEDICARE

## 2024-01-24 LAB
INR BLD: 1.2 INDEX
PROTHROMBIN TIME: 15.5 SECONDS (ref 11.7–14.5)

## 2024-01-24 PROCEDURE — 36415 COLL VENOUS BLD VENIPUNCTURE: CPT

## 2024-01-24 PROCEDURE — 85610 PROTHROMBIN TIME: CPT

## 2024-01-31 ENCOUNTER — HOSPITAL ENCOUNTER (OUTPATIENT)
Age: 79
Setting detail: SPECIMEN
Discharge: HOME | End: 2024-01-31
Payer: MEDICARE

## 2024-01-31 LAB
INR BLD: 1.2 INDEX
PROTHROMBIN TIME: 15.3 SECONDS (ref 11.7–14.5)

## 2024-01-31 PROCEDURE — 85610 PROTHROMBIN TIME: CPT

## 2024-01-31 PROCEDURE — 36415 COLL VENOUS BLD VENIPUNCTURE: CPT

## 2024-02-07 ENCOUNTER — HOSPITAL ENCOUNTER (OUTPATIENT)
Age: 79
Setting detail: SPECIMEN
Discharge: HOME | End: 2024-02-07
Payer: MEDICARE

## 2024-02-07 LAB
INR BLD: 1.2 INDEX
PROTHROMBIN TIME: 15 SECONDS (ref 11.7–14.5)

## 2024-02-07 PROCEDURE — 36415 COLL VENOUS BLD VENIPUNCTURE: CPT

## 2024-02-07 PROCEDURE — 85610 PROTHROMBIN TIME: CPT

## 2024-02-14 ENCOUNTER — HOSPITAL ENCOUNTER (OUTPATIENT)
Age: 79
Setting detail: SPECIMEN
Discharge: HOME | End: 2024-02-14
Payer: MEDICARE

## 2024-02-14 LAB
INR BLD: 1.1 INDEX
PROTHROMBIN TIME: 14.6 SECONDS (ref 11.7–14.5)

## 2024-02-14 PROCEDURE — 36415 COLL VENOUS BLD VENIPUNCTURE: CPT

## 2024-02-14 PROCEDURE — 85610 PROTHROMBIN TIME: CPT

## 2024-02-20 ENCOUNTER — TELEPHONE (OUTPATIENT)
Dept: GASTROENTEROLOGY | Age: 79
End: 2024-02-20

## 2024-02-20 NOTE — TELEPHONE ENCOUNTER
Spoke with facility pt resides in regarding referral for PEG tube put in by [facility]. Made appt for pt to see dr. Parekh on 3/20/24 @9am

## 2024-02-21 ENCOUNTER — HOSPITAL ENCOUNTER (OUTPATIENT)
Age: 79
Setting detail: SPECIMEN
Discharge: HOME | End: 2024-02-21
Payer: MEDICARE

## 2024-02-21 LAB
INR BLD: 1.2 INDEX
PROTHROMBIN TIME: 15.1 SECONDS (ref 11.7–14.5)

## 2024-02-21 PROCEDURE — 85610 PROTHROMBIN TIME: CPT

## 2024-02-21 PROCEDURE — 36415 COLL VENOUS BLD VENIPUNCTURE: CPT

## 2024-02-28 ENCOUNTER — HOSPITAL ENCOUNTER (OUTPATIENT)
Age: 79
Setting detail: SPECIMEN
Discharge: HOME | End: 2024-02-28
Payer: MEDICARE

## 2024-02-28 LAB
INR BLD: 1.1 INDEX
PROTHROMBIN TIME: 14.7 SECONDS (ref 11.7–14.5)

## 2024-02-28 PROCEDURE — 36415 COLL VENOUS BLD VENIPUNCTURE: CPT

## 2024-02-28 PROCEDURE — 85610 PROTHROMBIN TIME: CPT

## 2024-03-06 ENCOUNTER — OFFICE VISIT (OUTPATIENT)
Dept: GASTROENTEROLOGY | Age: 79
End: 2024-03-06
Payer: MEDICARE

## 2024-03-06 ENCOUNTER — HOSPITAL ENCOUNTER (OUTPATIENT)
Age: 79
Setting detail: SPECIMEN
Discharge: HOME | End: 2024-03-06
Payer: MEDICARE

## 2024-03-06 VITALS
SYSTOLIC BLOOD PRESSURE: 124 MMHG | HEART RATE: 60 BPM | TEMPERATURE: 97.1 F | DIASTOLIC BLOOD PRESSURE: 72 MMHG | OXYGEN SATURATION: 96 %

## 2024-03-06 DIAGNOSIS — K94.29 IRRITATION AROUND PERCUTANEOUS ENDOSCOPIC GASTROSTOMY (PEG) TUBE SITE (HCC): Primary | ICD-10-CM

## 2024-03-06 LAB
INR BLD: 1.1 INDEX
PROTHROMBIN TIME: 14.7 SECONDS (ref 11.7–14.5)

## 2024-03-06 PROCEDURE — 1123F ACP DISCUSS/DSCN MKR DOCD: CPT | Performed by: INTERNAL MEDICINE

## 2024-03-06 PROCEDURE — 36415 COLL VENOUS BLD VENIPUNCTURE: CPT

## 2024-03-06 PROCEDURE — G8427 DOCREV CUR MEDS BY ELIG CLIN: HCPCS | Performed by: INTERNAL MEDICINE

## 2024-03-06 PROCEDURE — 85610 PROTHROMBIN TIME: CPT

## 2024-03-06 PROCEDURE — G8484 FLU IMMUNIZE NO ADMIN: HCPCS | Performed by: INTERNAL MEDICINE

## 2024-03-06 PROCEDURE — G8419 CALC BMI OUT NRM PARAM NOF/U: HCPCS | Performed by: INTERNAL MEDICINE

## 2024-03-06 PROCEDURE — 4004F PT TOBACCO SCREEN RCVD TLK: CPT | Performed by: INTERNAL MEDICINE

## 2024-03-06 PROCEDURE — 99204 OFFICE O/P NEW MOD 45 MIN: CPT | Performed by: INTERNAL MEDICINE

## 2024-03-06 RX ORDER — ACETAMINOPHEN 325 MG/1
TABLET ORAL
COMMUNITY
Start: 2023-07-10

## 2024-03-06 RX ORDER — ONDANSETRON 4 MG/1
TABLET, FILM COATED ORAL
COMMUNITY
Start: 2023-07-20

## 2024-03-06 RX ORDER — ERYTHROMYCIN 5 MG/G
OINTMENT OPHTHALMIC
COMMUNITY
Start: 2024-01-22

## 2024-03-06 RX ORDER — ESCITALOPRAM OXALATE 20 MG/1
20 TABLET ORAL DAILY
COMMUNITY
Start: 2023-03-14

## 2024-03-06 RX ORDER — LEVETIRACETAM 100 MG/ML
1000 SOLUTION ORAL 2 TIMES DAILY
COMMUNITY

## 2024-03-06 RX ORDER — ESOMEPRAZOLE MAGNESIUM 40 MG/1
CAPSULE, DELAYED RELEASE ORAL
COMMUNITY
Start: 2024-03-01

## 2024-03-06 RX ORDER — ATORVASTATIN CALCIUM 80 MG/1
80 TABLET, FILM COATED ORAL NIGHTLY
COMMUNITY
Start: 2022-12-23

## 2024-03-06 RX ORDER — BROMPHENIRAMINE MALEATE, PSEUDOEPHEDRINE HYDROCHLORIDE, AND DEXTROMETHORPHAN HYDROBROMIDE 2; 30; 10 MG/5ML; MG/5ML; MG/5ML
10 SYRUP ORAL EVERY 4 HOURS PRN
COMMUNITY
Start: 2023-11-27

## 2024-03-06 RX ORDER — FEXOFENADINE HCL 180 MG/1
180 TABLET ORAL DAILY
COMMUNITY

## 2024-03-06 RX ORDER — INSULIN GLARGINE 100 [IU]/ML
INJECTION, SOLUTION SUBCUTANEOUS
COMMUNITY
Start: 2023-05-04

## 2024-03-06 RX ORDER — AMOXICILLIN 250 MG
2 CAPSULE ORAL DAILY PRN
COMMUNITY

## 2024-03-06 RX ORDER — CARVEDILOL 3.12 MG/1
3.12 TABLET ORAL 2 TIMES DAILY
COMMUNITY
Start: 2023-03-13

## 2024-03-06 RX ORDER — LOPERAMIDE HYDROCHLORIDE 2 MG/1
2 CAPSULE ORAL 4 TIMES DAILY PRN
COMMUNITY

## 2024-03-06 NOTE — PROGRESS NOTES
[facility]             MD Elijah Mike MD Carol Christensen, APRN-CNP       Mckenzie Nika, APRN-CNP             [address] [place] [address] [phone] fax [phone]        Gastroenterology Clinic Consultation    Elijah Parekh MD  Encounter Date: 03/06/24     CC: New Patient (PEG tube )       Gopi Perez  [address] [place] [address]     History obtained from: patient, medical records, daughter     Subjective:       Mohinder Moran is an 78 y.o. male with a history of Afib, DVT on coumadin, s/p R MCA stroke 12/2022 at [facility], s/p R hemicraniectomy at [place] 02/20233 for brain hemorrhage with MLS, s/p R cranioplasty 8/9/23 at [facility]. He was transferred from [facility] to [facility] 8/11/2023 with AMS ( bilateral SDHs) who presents for New Patient (PEG tube )    According to the daughter the PEG tube was placed in [facility] by the surgery team.  There was also some concern about PEG tube leaking with purulence back in August as well and in August CT of the abdomen and pelvis revealed PEG without any leakage and no abscess.    Per my review of records PEG tube was placed by Dr. Maicol Perry-gastroenterology back in December 2022.  PEG tube placed was an externally removable 20 Serbian gastrostomy tube. He was then admitted in 05/2023 for bleeding site PEG tube, This resolved after the placement of a pursestring stitch in the emergency department Dr. Nassar.  According to the daughter and the nursing home, the PEG has been working however frequently gets infection with possible PUS from the site and has required 4-5 Antibiotic courses. He has gained a lot of weight back and is mostly able to eat his food and PEG is only used for medications and occasional bolus feeds.     EGD with PEG placement 12/20/22 Dr. Perry  Impression:            - Normal esophagus.                          - Normal stomach.

## 2024-03-13 ENCOUNTER — HOSPITAL ENCOUNTER (OUTPATIENT)
Age: 79
Setting detail: SPECIMEN
Discharge: HOME | End: 2024-03-13
Payer: MEDICARE

## 2024-03-13 LAB
INR BLD: 1.2 INDEX
PROTHROMBIN TIME: 14.9 SECONDS (ref 11.7–14.5)

## 2024-03-13 PROCEDURE — 85610 PROTHROMBIN TIME: CPT

## 2024-03-13 PROCEDURE — 36415 COLL VENOUS BLD VENIPUNCTURE: CPT

## 2024-03-20 ENCOUNTER — HOSPITAL ENCOUNTER (OUTPATIENT)
Age: 79
Setting detail: SPECIMEN
Discharge: HOME | End: 2024-03-20
Payer: MEDICARE

## 2024-03-20 LAB
INR BLD: 1.2 INDEX
PROTHROMBIN TIME: 15 SECONDS (ref 11.7–14.5)

## 2024-03-20 PROCEDURE — 85610 PROTHROMBIN TIME: CPT

## 2024-03-20 PROCEDURE — 36415 COLL VENOUS BLD VENIPUNCTURE: CPT

## 2024-03-21 ENCOUNTER — HOSPITAL ENCOUNTER (OUTPATIENT)
Age: 79
Setting detail: SPECIMEN
Discharge: HOME | End: 2024-03-21
Payer: MEDICARE

## 2024-03-21 LAB
ANION GAP SERPL CALCULATED.3IONS-SCNC: 11 MMOL/L (ref 7–16)
BUN SERPL-MCNC: 20 MG/DL (ref 6–23)
CALCIUM SERPL-MCNC: 8.9 MG/DL (ref 8.3–10.6)
CHLORIDE BLD-SCNC: 101 MMOL/L (ref 99–110)
CO2: 29 MMOL/L (ref 21–32)
CREAT SERPL-MCNC: 0.9 MG/DL (ref 0.9–1.3)
GFR SERPL CREATININE-BSD FRML MDRD: >60 ML/MIN/1.73M2
GLUCOSE SERPL-MCNC: 111 MG/DL (ref 70–99)
POTASSIUM SERPL-SCNC: 4 MMOL/L (ref 3.5–5.1)
SODIUM BLD-SCNC: 141 MMOL/L (ref 135–145)

## 2024-03-21 PROCEDURE — 36415 COLL VENOUS BLD VENIPUNCTURE: CPT

## 2024-03-21 PROCEDURE — 80177 DRUG SCRN QUAN LEVETIRACETAM: CPT

## 2024-03-21 PROCEDURE — 80048 BASIC METABOLIC PNL TOTAL CA: CPT

## 2024-03-23 LAB — LEVETIRACETAM SERPL-MCNC: 58 UG/ML (ref 10–40)

## 2024-03-27 ENCOUNTER — HOSPITAL ENCOUNTER (OUTPATIENT)
Age: 79
Setting detail: SPECIMEN
Discharge: HOME | End: 2024-03-27
Payer: MEDICARE

## 2024-03-27 LAB
INR BLD: 1.2 INDEX
PROTHROMBIN TIME: 14.9 SECONDS (ref 11.7–14.5)

## 2024-03-27 PROCEDURE — 85610 PROTHROMBIN TIME: CPT

## 2024-03-27 PROCEDURE — 36415 COLL VENOUS BLD VENIPUNCTURE: CPT

## 2024-04-03 ENCOUNTER — HOSPITAL ENCOUNTER (OUTPATIENT)
Age: 79
Setting detail: SPECIMEN
Discharge: HOME | End: 2024-04-03
Payer: MEDICARE

## 2024-04-03 LAB
INR BLD: 1.1 INDEX
PROTHROMBIN TIME: 15 SECONDS (ref 11.7–14.5)

## 2024-04-03 PROCEDURE — 85610 PROTHROMBIN TIME: CPT

## 2024-04-03 PROCEDURE — 36415 COLL VENOUS BLD VENIPUNCTURE: CPT

## 2024-04-10 ENCOUNTER — HOSPITAL ENCOUNTER (OUTPATIENT)
Age: 79
Setting detail: SPECIMEN
Discharge: HOME | End: 2024-04-10
Payer: MEDICARE

## 2024-04-10 LAB
INR BLD: 1.1 INDEX
PROTHROMBIN TIME: 14.7 SECONDS (ref 11.7–14.5)

## 2024-04-10 PROCEDURE — 85610 PROTHROMBIN TIME: CPT

## 2024-04-10 PROCEDURE — 36415 COLL VENOUS BLD VENIPUNCTURE: CPT

## 2024-04-17 ENCOUNTER — HOSPITAL ENCOUNTER (OUTPATIENT)
Age: 79
Setting detail: SPECIMEN
Discharge: HOME | End: 2024-04-17
Payer: MEDICARE

## 2024-04-17 LAB
INR BLD: 1.2 INDEX
PROTHROMBIN TIME: 15.1 SECONDS (ref 11.7–14.5)

## 2024-04-17 PROCEDURE — 36415 COLL VENOUS BLD VENIPUNCTURE: CPT

## 2024-04-17 PROCEDURE — 85610 PROTHROMBIN TIME: CPT

## 2024-04-24 ENCOUNTER — HOSPITAL ENCOUNTER (OUTPATIENT)
Age: 79
Setting detail: SPECIMEN
Discharge: HOME | End: 2024-04-24
Payer: MEDICARE

## 2024-04-24 LAB
INR BLD: 1.1 INDEX
PROTHROMBIN TIME: 14.8 SECONDS (ref 11.7–14.5)

## 2024-04-24 PROCEDURE — 36415 COLL VENOUS BLD VENIPUNCTURE: CPT

## 2024-04-24 PROCEDURE — 85610 PROTHROMBIN TIME: CPT

## 2024-05-01 ENCOUNTER — HOSPITAL ENCOUNTER (OUTPATIENT)
Age: 79
Setting detail: SPECIMEN
Discharge: HOME | End: 2024-05-01
Payer: MEDICARE

## 2024-05-01 LAB
INR BLD: 1 INDEX
PROTHROMBIN TIME: 13.9 SECONDS (ref 11.7–14.5)

## 2024-05-01 PROCEDURE — 36415 COLL VENOUS BLD VENIPUNCTURE: CPT

## 2024-05-01 PROCEDURE — 85610 PROTHROMBIN TIME: CPT

## 2024-05-08 ENCOUNTER — HOSPITAL ENCOUNTER (OUTPATIENT)
Age: 79
Setting detail: SPECIMEN
Discharge: HOME | End: 2024-05-08
Payer: MEDICARE

## 2024-05-08 LAB
INR BLD: 1.1 INDEX
PROTHROMBIN TIME: 14.4 SECONDS (ref 11.7–14.5)

## 2024-05-08 PROCEDURE — 85610 PROTHROMBIN TIME: CPT

## 2024-05-08 PROCEDURE — 36415 COLL VENOUS BLD VENIPUNCTURE: CPT

## 2024-07-24 ENCOUNTER — OFFICE VISIT (OUTPATIENT)
Dept: GASTROENTEROLOGY | Age: 79
End: 2024-07-24
Payer: MEDICARE

## 2024-07-24 ENCOUNTER — PREP FOR PROCEDURE (OUTPATIENT)
Dept: GASTROENTEROLOGY | Age: 79
End: 2024-07-24

## 2024-07-24 VITALS
BODY MASS INDEX: 32.47 KG/M2 | HEART RATE: 54 BPM | TEMPERATURE: 97.4 F | HEIGHT: 69 IN | DIASTOLIC BLOOD PRESSURE: 64 MMHG | SYSTOLIC BLOOD PRESSURE: 128 MMHG | OXYGEN SATURATION: 92 %

## 2024-07-24 DIAGNOSIS — K94.29 IRRITATION AROUND PERCUTANEOUS ENDOSCOPIC GASTROSTOMY (PEG) TUBE SITE (HCC): ICD-10-CM

## 2024-07-24 DIAGNOSIS — K94.29 IRRITATION AROUND PERCUTANEOUS ENDOSCOPIC GASTROSTOMY (PEG) TUBE SITE (HCC): Primary | ICD-10-CM

## 2024-07-24 PROCEDURE — G8427 DOCREV CUR MEDS BY ELIG CLIN: HCPCS | Performed by: INTERNAL MEDICINE

## 2024-07-24 PROCEDURE — G8417 CALC BMI ABV UP PARAM F/U: HCPCS | Performed by: INTERNAL MEDICINE

## 2024-07-24 PROCEDURE — 4004F PT TOBACCO SCREEN RCVD TLK: CPT | Performed by: INTERNAL MEDICINE

## 2024-07-24 PROCEDURE — 99213 OFFICE O/P EST LOW 20 MIN: CPT | Performed by: INTERNAL MEDICINE

## 2024-07-24 PROCEDURE — 1123F ACP DISCUSS/DSCN MKR DOCD: CPT | Performed by: INTERNAL MEDICINE

## 2024-07-24 NOTE — PROGRESS NOTES
tube site noted, erythematous tissue now healed.  No actual discharge noted. soft, non-tender. No masses,  no hepatospleenomegally  Skin: Skin color, texture, turgor normal. No rashes or lesions  Psychiatry: Mood appropriate, no evidence of psychosis        Labs: Reviewed last labs/outside records      Assessment and Plan:  Mohinder was seen today for follow-up.    Diagnoses and all orders for this visit:    Irritation around percutaneous endoscopic gastrostomy (PEG) tube site (HCC)  -     EGD Routine; Future         Diagnosis Orders   1. Irritation around percutaneous endoscopic gastrostomy (PEG) tube site (HCC)  EGD Routine          No orders of the defined types were placed in this encounter.    #1 PEG tube Erythema and Peristomal leakege   - Leakage minimal   - Peristomal erythema - no pus noted   - PEG tube site examined, discussed with Patients daughter and Tanesha (patient's nurse at [facility])  - PEG tube loosened by me from 2 to 2.5   - Zn oxide paste   - Currently does not appear infection   - We also discussed about going to the ER and repeat imaging if his symptoms worsen and consider Repeat CT abd/pelvis.  Prior CT abdomen and pelvis at [facility] done in August 16, 2023 did not reveal any abscess and showed gastrostomy tube appropriately positioned without surrounding fluid collection to suggest abscess.  - Currently he uses PEG tube for medications.   - currently functional but getting more worn out- concern due to possible stitch will plan for replacement of PEG tube with EGD.      No follow-ups on file.    Elijah Parekh MD 7/24/2024 1:13 PM     Time of note may not reflect time of encounter      CC: Referring MD

## 2024-07-25 RX ORDER — SODIUM CHLORIDE 0.9 % (FLUSH) 0.9 %
5-40 SYRINGE (ML) INJECTION EVERY 12 HOURS SCHEDULED
Status: CANCELLED | OUTPATIENT
Start: 2024-07-25

## 2024-07-25 RX ORDER — SODIUM CHLORIDE 9 MG/ML
INJECTION, SOLUTION INTRAVENOUS PRN
Status: CANCELLED | OUTPATIENT
Start: 2024-07-25

## 2024-07-25 RX ORDER — SODIUM CHLORIDE, SODIUM LACTATE, POTASSIUM CHLORIDE, CALCIUM CHLORIDE 600; 310; 30; 20 MG/100ML; MG/100ML; MG/100ML; MG/100ML
INJECTION, SOLUTION INTRAVENOUS CONTINUOUS
Status: CANCELLED | OUTPATIENT
Start: 2024-07-25

## 2024-07-25 RX ORDER — SODIUM CHLORIDE 0.9 % (FLUSH) 0.9 %
5-40 SYRINGE (ML) INJECTION PRN
Status: CANCELLED | OUTPATIENT
Start: 2024-07-25

## 2024-07-26 ENCOUNTER — TELEPHONE (OUTPATIENT)
Dept: GASTROENTEROLOGY | Age: 79
End: 2024-07-26

## 2024-07-26 NOTE — TELEPHONE ENCOUNTER
Patient's daughter Manju called and was asking what the endoscopy was doing if it was to replace the Peg tube and if it could be done without the patient being put to sleep. I spoke with Salome AGUAYO and she said that the procedure was to replace the Peg tube and that he would be put to sleep. I called the patient's daughter Manju back and let her know what the MA had told me and she said she was good with the patient to have the procedure done with Dr Parekh, she was just clarifying with the office because said patient is currently on hospice and she felt that this procedure was medically necessary.

## 2024-07-30 ENCOUNTER — HOSPITAL ENCOUNTER (OUTPATIENT)
Age: 79
Setting detail: SPECIMEN
Discharge: HOME | End: 2024-07-30
Payer: MEDICARE

## 2024-07-30 LAB — GLUCOSE P FAST SERPL-MCNC: 95 MG/DL (ref 70–99)

## 2024-07-30 PROCEDURE — 82947 ASSAY GLUCOSE BLOOD QUANT: CPT

## 2024-07-30 PROCEDURE — 36415 COLL VENOUS BLD VENIPUNCTURE: CPT

## 2024-08-16 ENCOUNTER — TELEPHONE (OUTPATIENT)
Dept: GASTROENTEROLOGY | Age: 79
End: 2024-08-16

## 2024-09-20 ENCOUNTER — ANESTHESIA EVENT (OUTPATIENT)
Dept: ENDOSCOPY | Age: 79
End: 2024-09-20
Payer: MEDICARE

## 2024-09-23 ENCOUNTER — HOSPITAL ENCOUNTER (OUTPATIENT)
Age: 79
Setting detail: OUTPATIENT SURGERY
Discharge: HOME | End: 2024-09-23
Attending: INTERNAL MEDICINE | Admitting: INTERNAL MEDICINE
Payer: MEDICARE

## 2024-09-23 ENCOUNTER — ANESTHESIA (OUTPATIENT)
Dept: ENDOSCOPY | Age: 79
End: 2024-09-23
Payer: MEDICARE

## 2024-09-23 VITALS
OXYGEN SATURATION: 94 % | TEMPERATURE: 96.9 F | RESPIRATION RATE: 18 BRPM | HEART RATE: 97 BPM | SYSTOLIC BLOOD PRESSURE: 111 MMHG | DIASTOLIC BLOOD PRESSURE: 77 MMHG

## 2024-09-23 LAB — GLUCOSE BLD-MCNC: 110 MG/DL (ref 74–99)

## 2024-09-23 PROCEDURE — 7100000011 HC PHASE II RECOVERY - ADDTL 15 MIN: Performed by: INTERNAL MEDICINE

## 2024-09-23 PROCEDURE — 2580000003 HC RX 258: Performed by: LICENSED PRACTICAL NURSE

## 2024-09-23 PROCEDURE — 2709999900 HC NON-CHARGEABLE SUPPLY: Performed by: INTERNAL MEDICINE

## 2024-09-23 PROCEDURE — 3609013300 HC EGD TUBE PLACEMENT: Performed by: INTERNAL MEDICINE

## 2024-09-23 PROCEDURE — 2580000003 HC RX 258: Performed by: INTERNAL MEDICINE

## 2024-09-23 PROCEDURE — 3700000000 HC ANESTHESIA ATTENDED CARE: Performed by: INTERNAL MEDICINE

## 2024-09-23 PROCEDURE — 43246 EGD PLACE GASTROSTOMY TUBE: CPT | Performed by: INTERNAL MEDICINE

## 2024-09-23 PROCEDURE — 82962 GLUCOSE BLOOD TEST: CPT

## 2024-09-23 PROCEDURE — 7100000010 HC PHASE II RECOVERY - FIRST 15 MIN: Performed by: INTERNAL MEDICINE

## 2024-09-23 PROCEDURE — 6360000002 HC RX W HCPCS

## 2024-09-23 PROCEDURE — 6360000002 HC RX W HCPCS: Performed by: INTERNAL MEDICINE

## 2024-09-23 PROCEDURE — 3700000001 HC ADD 15 MINUTES (ANESTHESIA): Performed by: INTERNAL MEDICINE

## 2024-09-23 PROCEDURE — 2500000003 HC RX 250 WO HCPCS

## 2024-09-23 RX ORDER — SODIUM CHLORIDE 9 MG/ML
INJECTION, SOLUTION INTRAVENOUS PRN
Status: DISCONTINUED | OUTPATIENT
Start: 2024-09-23 | End: 2024-09-23 | Stop reason: HOSPADM

## 2024-09-23 RX ORDER — SODIUM CHLORIDE 0.9 % (FLUSH) 0.9 %
5-40 SYRINGE (ML) INJECTION PRN
Status: DISCONTINUED | OUTPATIENT
Start: 2024-09-23 | End: 2024-09-23 | Stop reason: HOSPADM

## 2024-09-23 RX ORDER — SODIUM CHLORIDE 0.9 % (FLUSH) 0.9 %
5-40 SYRINGE (ML) INJECTION EVERY 12 HOURS SCHEDULED
Status: DISCONTINUED | OUTPATIENT
Start: 2024-09-23 | End: 2024-09-23 | Stop reason: HOSPADM

## 2024-09-23 RX ORDER — LIDOCAINE HYDROCHLORIDE 20 MG/ML
INJECTION, SOLUTION INFILTRATION; PERINEURAL
Status: DISCONTINUED | OUTPATIENT
Start: 2024-09-23 | End: 2024-09-23 | Stop reason: SDUPTHER

## 2024-09-23 RX ORDER — GLYCOPYRROLATE 0.2 MG/ML
INJECTION INTRAMUSCULAR; INTRAVENOUS
Status: DISCONTINUED | OUTPATIENT
Start: 2024-09-23 | End: 2024-09-23 | Stop reason: SDUPTHER

## 2024-09-23 RX ORDER — PROPOFOL 10 MG/ML
INJECTION, EMULSION INTRAVENOUS
Status: DISCONTINUED | OUTPATIENT
Start: 2024-09-23 | End: 2024-09-23 | Stop reason: SDUPTHER

## 2024-09-23 RX ORDER — SODIUM CHLORIDE, SODIUM LACTATE, POTASSIUM CHLORIDE, CALCIUM CHLORIDE 600; 310; 30; 20 MG/100ML; MG/100ML; MG/100ML; MG/100ML
INJECTION, SOLUTION INTRAVENOUS CONTINUOUS
Status: DISCONTINUED | OUTPATIENT
Start: 2024-09-23 | End: 2024-09-23 | Stop reason: HOSPADM

## 2024-09-23 RX ADMIN — CEFAZOLIN 2000 MG: 2 INJECTION, POWDER, FOR SOLUTION INTRAMUSCULAR; INTRAVENOUS at 13:05

## 2024-09-23 RX ADMIN — SODIUM CHLORIDE, POTASSIUM CHLORIDE, SODIUM LACTATE AND CALCIUM CHLORIDE: 600; 310; 30; 20 INJECTION, SOLUTION INTRAVENOUS at 12:49

## 2024-09-23 RX ADMIN — LIDOCAINE HYDROCHLORIDE 100 MG: 20 INJECTION, SOLUTION INFILTRATION; PERINEURAL at 13:10

## 2024-09-23 RX ADMIN — GLYCOPYRROLATE 0.2 MG: 0.2 INJECTION INTRAMUSCULAR; INTRAVENOUS at 13:05

## 2024-09-23 RX ADMIN — PROPOFOL 50 MG: 10 INJECTION, EMULSION INTRAVENOUS at 13:12

## 2024-09-23 ASSESSMENT — PAIN - FUNCTIONAL ASSESSMENT
PAIN_FUNCTIONAL_ASSESSMENT: 0-10
PAIN_FUNCTIONAL_ASSESSMENT: 0-10

## 2024-10-01 ENCOUNTER — APPOINTMENT (OUTPATIENT)
Dept: CT IMAGING | Age: 79
End: 2024-10-01
Payer: MEDICARE

## 2024-10-01 ENCOUNTER — HOSPITAL ENCOUNTER (EMERGENCY)
Age: 79
Discharge: HOME | End: 2024-10-01
Attending: EMERGENCY MEDICINE
Payer: MEDICARE

## 2024-10-01 VITALS
TEMPERATURE: 98.1 F | RESPIRATION RATE: 16 BRPM | HEART RATE: 57 BPM | OXYGEN SATURATION: 96 % | SYSTOLIC BLOOD PRESSURE: 169 MMHG | BODY MASS INDEX: 32.58 KG/M2 | WEIGHT: 220 LBS | DIASTOLIC BLOOD PRESSURE: 94 MMHG | HEIGHT: 69 IN

## 2024-10-01 DIAGNOSIS — Z43.1 ATTENTION TO G-TUBE (HCC): Primary | ICD-10-CM

## 2024-10-01 PROBLEM — K94.23 LEAKING PEG TUBE (HCC): Status: ACTIVE | Noted: 2024-10-01

## 2024-10-01 LAB
ALBUMIN SERPL-MCNC: 3.4 G/DL (ref 3.4–5)
ALBUMIN/GLOB SERPL: 1.2 {RATIO} (ref 1.1–2.2)
ALP SERPL-CCNC: 77 U/L (ref 40–129)
ALT SERPL-CCNC: <5 U/L (ref 10–40)
ANION GAP SERPL CALCULATED.3IONS-SCNC: 13 MMOL/L (ref 9–17)
AST SERPL-CCNC: 27 U/L (ref 15–37)
BASOPHILS # BLD: 0.05 K/UL
BASOPHILS NFR BLD: 1 % (ref 0–1)
BILIRUB SERPL-MCNC: 0.3 MG/DL (ref 0–1)
BUN SERPL-MCNC: 19 MG/DL (ref 7–20)
CALCIUM SERPL-MCNC: 8.8 MG/DL (ref 8.3–10.6)
CHLORIDE SERPL-SCNC: 103 MMOL/L (ref 99–110)
CO2 SERPL-SCNC: 25 MMOL/L (ref 21–32)
CREAT SERPL-MCNC: 1.1 MG/DL (ref 0.8–1.3)
EOSINOPHIL # BLD: 0.23 K/UL
EOSINOPHILS RELATIVE PERCENT: 5 % (ref 0–3)
ERYTHROCYTE [DISTWIDTH] IN BLOOD BY AUTOMATED COUNT: 13.4 % (ref 11.7–14.9)
GFR, ESTIMATED: 68 ML/MIN/1.73M2
GLUCOSE SERPL-MCNC: 126 MG/DL (ref 74–99)
HCT VFR BLD AUTO: 42.2 % (ref 42–52)
HGB BLD-MCNC: 12.4 G/DL (ref 13.5–18)
IMM GRANULOCYTES # BLD AUTO: 0.01 K/UL
IMM GRANULOCYTES NFR BLD: 0 %
LIPASE SERPL-CCNC: 32 U/L (ref 13–60)
LYMPHOCYTES NFR BLD: 1.11 K/UL
LYMPHOCYTES RELATIVE PERCENT: 23 % (ref 24–44)
MCH RBC QN AUTO: 29 PG (ref 27–31)
MCHC RBC AUTO-ENTMCNC: 29.4 G/DL (ref 32–36)
MCV RBC AUTO: 98.6 FL (ref 78–100)
MONOCYTES NFR BLD: 0.84 K/UL
MONOCYTES NFR BLD: 17 % (ref 0–4)
NEUTROPHILS NFR BLD: 54 % (ref 36–66)
NEUTS SEG NFR BLD: 2.65 K/UL
PLATELET CONFIRMATION: NORMAL
PLATELET, FLUORESCENCE: 130 K/UL (ref 140–440)
PMV BLD AUTO: 13.3 FL (ref 7.5–11.1)
POTASSIUM SERPL-SCNC: 3.6 MMOL/L (ref 3.5–5.1)
PROT SERPL-MCNC: 6.3 G/DL (ref 6.4–8.2)
RBC # BLD AUTO: 4.28 M/UL (ref 4.6–6.2)
RBC # BLD: NORMAL 10*6/UL
SODIUM SERPL-SCNC: 141 MMOL/L (ref 136–145)
WBC # BLD: NORMAL 10*3/UL
WBC OTHER # BLD: 4.9 K/UL (ref 4–10.5)

## 2024-10-01 PROCEDURE — 83690 ASSAY OF LIPASE: CPT

## 2024-10-01 PROCEDURE — 6360000004 HC RX CONTRAST MEDICATION: Performed by: EMERGENCY MEDICINE

## 2024-10-01 PROCEDURE — 80053 COMPREHEN METABOLIC PANEL: CPT

## 2024-10-01 PROCEDURE — 74177 CT ABD & PELVIS W/CONTRAST: CPT

## 2024-10-01 PROCEDURE — 6370000000 HC RX 637 (ALT 250 FOR IP): Performed by: EMERGENCY MEDICINE

## 2024-10-01 PROCEDURE — 2500000003 HC RX 250 WO HCPCS: Performed by: LICENSED PRACTICAL NURSE

## 2024-10-01 PROCEDURE — 85025 COMPLETE CBC W/AUTO DIFF WBC: CPT

## 2024-10-01 PROCEDURE — 99285 EMERGENCY DEPT VISIT HI MDM: CPT

## 2024-10-01 PROCEDURE — 99222 1ST HOSP IP/OBS MODERATE 55: CPT | Performed by: INTERNAL MEDICINE

## 2024-10-01 PROCEDURE — APPNB45 APP NON BILLABLE 31-45 MINUTES: Performed by: LICENSED PRACTICAL NURSE

## 2024-10-01 RX ORDER — IOPAMIDOL 755 MG/ML
70 INJECTION, SOLUTION INTRAVASCULAR
Status: COMPLETED | OUTPATIENT
Start: 2024-10-01 | End: 2024-10-01

## 2024-10-01 RX ORDER — POLYETHYLENE GLYCOL 3350 17 G/17G
17 POWDER, FOR SOLUTION ORAL DAILY
COMMUNITY
Start: 2024-08-30

## 2024-10-01 RX ORDER — XYLITOL/YERBA SANTA
15 AEROSOL, SPRAY WITH PUMP (ML) MUCOUS MEMBRANE EVERY 6 HOURS PRN
COMMUNITY

## 2024-10-01 RX ORDER — NIACINAMIDE, ADENOSINE 1; .02 G/50ML; G/50ML
CREAM TOPICAL
COMMUNITY
Start: 2024-09-28

## 2024-10-01 RX ORDER — ZINC OXIDE
OINTMENT (GRAM) TOPICAL 4 TIMES DAILY PRN
Status: DISCONTINUED | OUTPATIENT
Start: 2024-10-01 | End: 2024-10-02 | Stop reason: HOSPADM

## 2024-10-01 RX ORDER — CARBOXYMETHYLCELLULOSE SODIUM AND GLYCERIN 5; 9 MG/ML; MG/ML
2 SOLUTION/ DROPS OPHTHALMIC 2 TIMES DAILY
COMMUNITY
Start: 2024-09-29

## 2024-10-01 RX ORDER — SUCRALFATE 1 G/1
1 TABLET ORAL
Status: DISCONTINUED | OUTPATIENT
Start: 2024-10-01 | End: 2024-10-02 | Stop reason: HOSPADM

## 2024-10-01 RX ADMIN — MICONAZOLE NITRATE: 2 POWDER TOPICAL at 20:44

## 2024-10-01 RX ADMIN — Medication: at 20:44

## 2024-10-01 RX ADMIN — SUCRALFATE 1 G: 1 TABLET ORAL at 19:35

## 2024-10-01 RX ADMIN — IOPAMIDOL 70 ML: 755 INJECTION, SOLUTION INTRAVENOUS at 17:22

## 2024-10-01 ASSESSMENT — PAIN SCALES - GENERAL
PAINLEVEL_OUTOF10: 7
PAINLEVEL_OUTOF10: 5

## 2024-10-01 ASSESSMENT — PAIN DESCRIPTION - LOCATION
LOCATION: ABDOMEN
LOCATION: ABDOMEN

## 2024-10-01 ASSESSMENT — PAIN - FUNCTIONAL ASSESSMENT
PAIN_FUNCTIONAL_ASSESSMENT: 0-10
PAIN_FUNCTIONAL_ASSESSMENT: NONE - DENIES PAIN
PAIN_FUNCTIONAL_ASSESSMENT: ACTIVITIES ARE NOT PREVENTED
PAIN_FUNCTIONAL_ASSESSMENT: 0-10

## 2024-10-01 ASSESSMENT — PAIN DESCRIPTION - DESCRIPTORS: DESCRIPTORS: PATIENT UNABLE TO DESCRIBE

## 2024-10-01 ASSESSMENT — PAIN DESCRIPTION - ORIENTATION: ORIENTATION: LEFT;ANTERIOR

## 2024-10-01 NOTE — ED NOTES
Medication History  [facility]    Patient Name: Mohinder Moran 1945     Medication history has been completed by: Roshni Farley CPhT    Source(s) of information: Medication list provided by [facility]     Primary Care Physician: No primary care provider on file.     Pharmacy:     Allergies as of 10/01/2024    (No Known Allergies)        Prior to Admission medications    Medication Sig Start Date End Date Taking? Authorizing Provider   polyethylene glycol (GLYCOLAX) 17 GM/SCOOP powder 17 g by Per G Tube route daily 8/30/24  Yes Aleksandar Bob MD   THICK-IT POWD  9/28/24  Yes Aleksandar Bob MD   REFRESH OPTIVE 0.5-0.9 % SOLN Place 2 drops into both eyes 2 times daily 9/29/24  Yes ProviderAleksandar MD   Artificial Saliva (MOUTH KOTE) SOLN solution Take 15 mLs by mouth every 6 hours as needed   Yes Aleksandar Bob MD   esomeprazole (NEXIUM) 40 MG delayed release capsule Take 1 capsule by mouth every morning (before breakfast) 3/1/24  Yes ProviderAleksandar MD   escitalopram (LEXAPRO) 20 MG tablet 1 tablet by Enteral route daily 3/14/23  Yes ProviderAleksandar MD   fexofenadine (ALLEGRA) 180 MG tablet 1 tablet by PEG Tube route daily   Yes Aleksandar Bob MD   levETIRAcetam (KEPPRA) 100 MG/ML oral solution 10 mLs by Enteral route 2 times daily   Yes ProviderAleksandar MD   senna-docusate (PERICOLACE) 8.6-50 MG per tablet 2 tablets by PEG Tube route daily as needed    ProviderAleksandar MD   loperamide (IMODIUM) 2 MG capsule 1 capsule by PEG Tube route 4 times daily as needed    ProviderAleksandar MD   acetaminophen (TYLENOL) 325 MG tablet  7/10/23   Aleksandar Bob MD   ondansetron (ZOFRAN) 4 MG tablet  7/20/23   Aleksandar Bob MD   brompheniramine-pseudoephedrine-DM 2-30-10 MG/5ML syrup Take 10 mLs by mouth every 4 hours as needed 11/27/23   Aleksandar Bob MD     Medications added or changed (ex. new

## 2024-10-01 NOTE — CONSULTS
POA.  ROS and HPI was provided by daughter as patient is difficult to arouse due to fatigue and cognitive impairment.  Daughter endorses that the patient has been at [facility] for rehab and is going to be coming home here in about a week or 2.  Daughter endorses that there was an incident that occurred at [facility] where the G-tube was pulled on but she denied it actually being pulled out of his abdomen.  I did discuss with her my concerns about the redness around his G-tube site as well as provided some education in regards to the bumper and where it should be at.  On initial presentation the bumper was noted to be at the 10 cm sergio with a decent amount of drainage coming from the tract.  I adjusted this to the 4-1/2/5 cm sergio and educated her on how to tell if the bumper is too tight or too loose.  I also discussed with her on the need to keep the G-tube site clean and dry.  I also discussed with her proper barrier creams to use for irritation/discomfort.    ROS: Per history of present illness. All other systems were reviewed and were negative.     Allergies:  No Known Allergies.    Medications:   No current facility-administered medications for this encounter.     Current Outpatient Medications   Medication Sig Dispense Refill    polyethylene glycol (GLYCOLAX) 17 GM/SCOOP powder 17 g by Per G Tube route daily      THICK-IT POWD       REFRESH OPTIVE 0.5-0.9 % SOLN Place 2 drops into both eyes 2 times daily      Artificial Saliva (MOUTH KOTE) SOLN solution Take 15 mLs by mouth every 6 hours as needed      esomeprazole (NEXIUM) 40 MG delayed release capsule Take 1 capsule by mouth every morning (before breakfast)      escitalopram (LEXAPRO) 20 MG tablet 1 tablet by Enteral route daily      fexofenadine (ALLEGRA) 180 MG tablet 1 tablet by PEG Tube route daily      levETIRAcetam (KEPPRA) 100 MG/ML oral solution 10 mLs by Enteral route 2 times daily      senna-docusate (PERICOLACE) 8.6-50 MG per tablet 2
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
## 2024-10-02 NOTE — ED NOTES
Pt rolled and cleaned of soiled brief. Desitin applied to buttocks. Micotin powder applied to PEG site. Pt awaiting transport to facility.

## 2024-10-14 ENCOUNTER — TELEPHONE (OUTPATIENT)
Dept: GASTROENTEROLOGY | Age: 79
End: 2024-10-14

## 2024-10-14 NOTE — TELEPHONE ENCOUNTER
Patients daughter called and requested that Mckenzie give her a call, they had him at the er around 10/3 and then she took him home and there was an infection and the hospice nurse recommended that she call and speak with Mckenzie. Manju would like to speak with Mckenzie if she could give her a call.

## 2024-10-17 NOTE — TELEPHONE ENCOUNTER
Manju Moran was called back.  Daughter informed me that the hospice nurse has been coming out to visit her father and has been teaching her how to use the PEG tube.  She wanted to inform us that the redness/irritation and concerns for infection has completely cleared up.  She also inform me that the tube is working perfectly no clogging no issues with flushing no issues with feedings going through.  She did discuss having her concerns about possibly overfeeding her father.  She stated that the patient is eating anywhere between 800-600 twyla per meal 3 times a day.  I discussed with her consulting and nutrition to get a good calorie count..  With clear instructions on when to supplement with Glucerna.  I personally sent a message to the office asking for a referral to be put in to speak with a dietitian.

## (undated) DEVICE — ENDOSCOPY KIT: Brand: MEDLINE INDUSTRIES, INC.

## (undated) DEVICE — Device